# Patient Record
Sex: FEMALE | Race: WHITE | URBAN - METROPOLITAN AREA
[De-identification: names, ages, dates, MRNs, and addresses within clinical notes are randomized per-mention and may not be internally consistent; named-entity substitution may affect disease eponyms.]

---

## 2018-04-11 ENCOUNTER — THERAPY VISIT (OUTPATIENT)
Dept: PHYSICAL THERAPY | Facility: CLINIC | Age: 48
End: 2018-04-11
Payer: MEDICAID

## 2018-04-11 DIAGNOSIS — M54.2 CERVICAL PAIN: Primary | ICD-10-CM

## 2018-04-11 PROCEDURE — 97110 THERAPEUTIC EXERCISES: CPT | Mod: GP | Performed by: PHYSICAL THERAPIST

## 2018-04-11 PROCEDURE — 97161 PT EVAL LOW COMPLEX 20 MIN: CPT | Mod: GP | Performed by: PHYSICAL THERAPIST

## 2018-04-11 PROCEDURE — 97140 MANUAL THERAPY 1/> REGIONS: CPT | Mod: GP | Performed by: PHYSICAL THERAPIST
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
## 2018-04-11 NOTE — MR AVS SNAPSHOT
After Visit Summary   4/11/2018    Tran House    MRN: [id number]           Patient Information     Date Of Birth          1970        Visit Information        Provider Department      4/11/2018 5:20 PM Wei Morse PT [facility] Physical Therapy        Today's Diagnoses     Cervical pain    -  1       Follow-ups after your visit        Your next 10 appointments already scheduled     Apr 20, 2018  5:20 PM CDT   [facility] Spine with Wei Morse PT   [facility] ([facility]  )    [address] [place] [id number] [phone]            Apr 27, 2018  5:20 PM CDT   GI[facility] Spine with Wei Morse PT   [facility] Physical Therapy ([facility]  )    [address] [place] [id number] [phone]            May 02, 2018  4:40 PM CDT   [facility] Spine with Wei Morse PT   [facility] ([facility]  )    [address] [place] [id number] [phone]            May 09, 2018  4:40 PM CDT   GI[facility]NA Spine with Wei Morse PT   [facility] Physical Therapy ([facility]  )    [address] [place] [id number] [phone]            May 16, 2018  4:40 PM CDT   GI[facility]NA Spine with Wei Morse PT   [facility] Physical Therapy ([facility]  )    [address] [place] [id number] [phone]              Who to contact     If you have questions or need follow up information about today's clinic visit or your schedule please contact [facility] ATHLETIC [facility] PHYSICAL THERAPY directly at [phone].  Normal or non-critical lab and imaging results will be communicated to you by MyChart, letter or phone within 4 business days after the clinic has received the results. If you do not hear from  "us within 7 days, please contact the clinic through [employer] or phone. If you have a critical or abnormal lab result, we will notify you by phone as soon as possible.  Submit refill requests through [employer] or call your pharmacy and they will forward the refill request to us. Please allow 3 business days for your refill to be completed.          Additional Information About Your Visit        [employer] Information     [employer] lets you send messages to your doctor, view your test results, renew your prescriptions, schedule appointments and more. To sign up, go to [email or web address] . Click on \"Log in\" on the left side of the screen, which will take you to the Welcome page. Then click on \"Sign up Now\" on the right side of the page.     You will be asked to enter the access code listed below, as well as some personal information. Please follow the directions to create your username and password.     Your access code is: [email or web address]  Expires: 2018  8:50 AM     Your access code will  in 90 days. If you need help or a new code, please call your [place] clinic or [phone].        Care EveryWhere ID     This is your Care EveryWhere ID. This could be used by other organizations to access your [place] medical records  [id number]         Blood Pressure from Last 3 Encounters:   No data found for BP    Weight from Last 3 Encounters:   No data found for Wt              We Performed the Following     HC PT EVAL, LOW COMPLEXITY     GI[facility]NA INITIAL EVAL REPORT     MANUAL THER TECH,1+REGIONS,EA 15 MIN     THERAPEUTIC EXERCISES        Primary Care Provider Office Phone # Fax #    Aria Kramer [phone]       [facility]  [facility] [address]        Equal Access to Services     TAHIR VALDEZ : Hadii evan Albarran, junda niels, qaybta yusuf hartman. So [facility] [phone].    ATENCIÓN: Si pascale voss, " tiene a ashton disposición servicios gratuitos de asistencia lingüística. Mike espinoza [phone].    We comply with applicable federal civil rights laws and [place] laws. We do not discriminate on the basis of race, color, national origin, age, disability, sex, sexual orientation, or gender identity.            Thank you!     Thank you for choosing [facility] [place] [facility] PHYSICAL THERAPY  for your care. Our goal is always to provide you with excellent care. Hearing back from our patients is one way we can continue to improve our services. Please take a few minutes to complete the written survey that you may receive in the mail after your visit with us. Thank you!             Your Updated Medication List - Protect others around you: Learn how to safely use, store and throw away your medicines at www.disposemymeds.org.      Notice  As of 4/11/2018 11:59 PM    You have not been prescribed any medications.
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
## 2018-04-12 PROBLEM — M54.2 CERVICAL PAIN: Status: ACTIVE | Noted: 2018-04-12

## 2018-04-16 ENCOUNTER — HEALTH MAINTENANCE LETTER (OUTPATIENT)
Age: 48
End: 2018-04-16

## 2018-07-11 PROBLEM — M54.2 CERVICAL PAIN: Status: RESOLVED | Noted: 2018-04-12 | Resolved: 2018-07-11
